# Patient Record
Sex: MALE | Race: WHITE
[De-identification: names, ages, dates, MRNs, and addresses within clinical notes are randomized per-mention and may not be internally consistent; named-entity substitution may affect disease eponyms.]

---

## 2018-09-15 ENCOUNTER — HOSPITAL ENCOUNTER (EMERGENCY)
Dept: HOSPITAL 89 - ER | Age: 38
Discharge: HOME | End: 2018-09-15
Payer: SELF-PAY

## 2018-09-15 VITALS — SYSTOLIC BLOOD PRESSURE: 122 MMHG | DIASTOLIC BLOOD PRESSURE: 77 MMHG

## 2018-09-15 DIAGNOSIS — K51.011: Primary | ICD-10-CM

## 2018-09-15 LAB — PLATELET COUNT, AUTOMATED: 561 K/UL (ref 150–450)

## 2018-09-15 PROCEDURE — 82947 ASSAY GLUCOSE BLOOD QUANT: CPT

## 2018-09-15 PROCEDURE — 82435 ASSAY OF BLOOD CHLORIDE: CPT

## 2018-09-15 PROCEDURE — 96365 THER/PROPH/DIAG IV INF INIT: CPT

## 2018-09-15 PROCEDURE — 85651 RBC SED RATE NONAUTOMATED: CPT

## 2018-09-15 PROCEDURE — 82274 ASSAY TEST FOR BLOOD FECAL: CPT

## 2018-09-15 PROCEDURE — 87449 NOS EACH ORGANISM AG IA: CPT

## 2018-09-15 PROCEDURE — 96376 TX/PRO/DX INJ SAME DRUG ADON: CPT

## 2018-09-15 PROCEDURE — 87205 SMEAR GRAM STAIN: CPT

## 2018-09-15 PROCEDURE — 99284 EMERGENCY DEPT VISIT MOD MDM: CPT

## 2018-09-15 PROCEDURE — 84460 ALANINE AMINO (ALT) (SGPT): CPT

## 2018-09-15 PROCEDURE — 87045 FECES CULTURE AEROBIC BACT: CPT

## 2018-09-15 PROCEDURE — 84075 ASSAY ALKALINE PHOSPHATASE: CPT

## 2018-09-15 PROCEDURE — 84520 ASSAY OF UREA NITROGEN: CPT

## 2018-09-15 PROCEDURE — 84295 ASSAY OF SERUM SODIUM: CPT

## 2018-09-15 PROCEDURE — 84132 ASSAY OF SERUM POTASSIUM: CPT

## 2018-09-15 PROCEDURE — 74177 CT ABD & PELVIS W/CONTRAST: CPT

## 2018-09-15 PROCEDURE — 85025 COMPLETE CBC W/AUTO DIFF WBC: CPT

## 2018-09-15 PROCEDURE — 96366 THER/PROPH/DIAG IV INF ADDON: CPT

## 2018-09-15 PROCEDURE — 84155 ASSAY OF PROTEIN SERUM: CPT

## 2018-09-15 PROCEDURE — 82040 ASSAY OF SERUM ALBUMIN: CPT

## 2018-09-15 PROCEDURE — 84450 TRANSFERASE (AST) (SGOT): CPT

## 2018-09-15 PROCEDURE — 83690 ASSAY OF LIPASE: CPT

## 2018-09-15 PROCEDURE — 82565 ASSAY OF CREATININE: CPT

## 2018-09-15 PROCEDURE — 81001 URINALYSIS AUTO W/SCOPE: CPT

## 2018-09-15 PROCEDURE — 96368 THER/DIAG CONCURRENT INF: CPT

## 2018-09-15 PROCEDURE — 82374 ASSAY BLOOD CARBON DIOXIDE: CPT

## 2018-09-15 PROCEDURE — 87324 CLOSTRIDIUM AG IA: CPT

## 2018-09-15 PROCEDURE — 83630 LACTOFERRIN FECAL (QUAL): CPT

## 2018-09-15 PROCEDURE — 82247 BILIRUBIN TOTAL: CPT

## 2018-09-15 PROCEDURE — 82310 ASSAY OF CALCIUM: CPT

## 2018-09-15 PROCEDURE — 96375 TX/PRO/DX INJ NEW DRUG ADDON: CPT

## 2018-09-15 NOTE — ER REPORT
History and Physical


Time Seen By MD:  08:20


Hx. of Stated Complaint:  


PT REPORTS ULCERATIVE COLITIS FLARE UP. REPORTS ABD PAIN AND DARK BLOODY STOOLS.


HPI/ROS


CHIEF COMPLAINT: Abdominal pain





HISTORY OF PRESENT ILLNESS: Patient is a 38-year-old male who presents to the 


emergency Department with concerns of ulcerative colitis flare. Patient carries 


this diagnosis and is on chronic daily steroids, 40 mg prednisone daily he also 


takes mesalamine and receives IV Remicade infusions. Patient was camping and 


developed crampy abdominal pain and bloody diarrhea overnight. We were the 


nearest emergency department to him so he presents here this morning for 


evaluation. Patient normally follows at [facility] but 


that was further away. Patient denies any fevers but reports severe crampy 


abdominal pain, sweats chills. Denies any chest pain or shortness of breath. 


Patient has not had any prior surgeries for ulcerative colitis to this date.





REVIEW OF SYSTEMS:


Constitutional: No fever, chills


Eyes: No discharge.


ENT: No sore throat. 


Cardiovascular: No chest pain, no palpitations.


Respiratory: No cough, no shortness of breath.


Gastrointestinal: Crampy abdominal pain, nausea bloody diarrhea


Genitourinary: No hematuria.


Musculoskeletal: No back pain.


Skin: No rashes.


Neurological: No headache.


Allergies:  


Coded Allergies:  


     Penicillins (Verified  Allergy, Intermediate, RASH, 9/15/18)


     morphine (Verified  Adverse Reaction, Severe, 9/15/18)


   


   VIOLENT BEHAVIOR


Home Meds


Active Scripts


Oxycodone Hcl (OXYCODONE HCL) 5 Mg Tablet, 5 MG PO Q4H for PAIN, #20 TAB 0 


Refills


   Prov:SOFIE ENRIQUEZ MD         9/15/18


Metronidazole (FLAGYL) 500 Mg Tablet, 500 MG PO BID, #20 TAB 0 Refills


   Prov:SOFIE ENRIQUEZ MD         9/15/18


Ciprofloxacin Hcl (CIPRO) 500 Mg Tablet, 500 MG PO BID, #20 TAB 0 Refills


   Prov:SOFIE ENRIQUEZ MD         9/15/18


Reported Medications


Ferrous Sulfate (IRON) 325 Mg Tablet, 325 MG PO DAILY


   9/15/18


Folic Acid (FOLIC ACID) 1 Mg Tablet, PO QDAY, TAB


   9/15/18


Mercaptopurine (MERCAPTOPURINE) 50 Mg Tablet, 100 MG PO DAILY


   9/15/18


Infliximab (REMICADE) 100 Mg Soln, IV [id number]


   9/15/18


Prednisone (PREDNISONE) 20 Mg Tablet, 40 MG PO QDAY, TAB


   9/15/18


Discontinued Scripts


Oxycodone Hcl/Acetaminophen (PERCOCET 5-325 MG TABLET) 1 Each Tablet, 1 EACH PO 


Q4H for PAIN, #20 TAB 0 Refills


   Prov:SOFIE ENRIQUEZ MD         9/15/18


Past Medical/Surgical History


History of ulcerative colitis


Constitutional





Vital Sign - Last 24 Hours








 9/15/18 9/15/18 9/15/18 9/15/18





 08:10 08:15 08:30 08:45


 


Temp 98.2   


 


Pulse 110 114 115 115


 


Resp 16   


 


B/P (MAP) 139/94  129/88 (102) 


 


Pulse Ox 98 96 99 96


 


O2 Delivery Room Air   


 


    





 9/15/18 9/15/18 9/15/18 9/15/18





 09:00 09:30 09:45 10:00


 


Pulse 112 110 107 101


 


B/P (MAP) 130/87 (101) 126/86 (99)  124/80 (95)


 


Pulse Ox 97 100 99 98





 9/15/18 9/15/18 9/15/18 9/15/18





 10:15 10:30 10:45 11:03


 


Pulse 101 102 112 


 


B/P (MAP)  120/81 (94)  119/75 (90)


 


Pulse Ox 98 98 98 





 9/15/18 9/15/18 9/15/18 9/15/18





 11:15 11:30 11:45 11:46


 


Pulse 115 108 106 


 


B/P (MAP)  112/70 (84)  122/77 (92)


 


Pulse Ox 98 96 96 





 9/15/18   





 12:10   


 


Pulse 116   


 


Pulse Ox 90   


 


O2 Delivery Room Air   














Intake and Output   


 


 9/15/18 9/15/18 9/16/18





 15:00 23:00 07:00


 


Intake Total 1250 ml  


 


Balance 1250 ml  








Physical Exam


General/Constitutional: Patient is awake, alert, nontoxic and in no acute 


respiratory distress. 


Head: Normocephalic and atraumatic.


Eyes: Conjunctival clear, Pupils are equal and reactive to light. Extraocular 


muscles are intact and symmetrical. Sclera are clear and anicteric.


Ears:External canals are clear. Tympanic membranes are clear with normal 


landmarks and light reflex.


Nares: No rhinorrhea or bleeding. Turbinates are pink and moist.


Oropharyngeal: Mucous membranes are moist. There is no pharyngeal erythema or 


exudate. There are no palatal petechiae. Uvula is midline and symmetrical. 


Neck: Supple, no adenopathy.


Cardiovascular: Heart is regular rate and rhythm without audible murmurs, rubs 


or gallops. 


Pulmonary: Lungs are clear to auscultation bilaterally. There are no wheezes, 


rales, or rhonchi. Chest rise is symmetrical


Abdomen: Diffuse abdominal pain, increased bowel sounds no peritoneal signs


Extremities: No gross deformities, No peripheral cyanosis. Able to move all 4 


extremities.


Neuro: Alert and oriented X3, 


Skin: No rashes, skin is warm dry and well perfused.





Medical Decision Making


Data Points


Result Diagram:  


9/15/18 0820                                                                    


           9/15/18 0820





Laboratory





Hematology








Test


 9/15/18


08:20 9/15/18


08:44 9/15/18


11:04


 


Red Blood Count


 3.60 M/uL


(4.00-5.60) 


 





 


Mean Corpuscular Volume


 87.7 fL


(80.0-96.0) 


 





 


Mean Corpuscular Hemoglobin


 28.0 pg


(26.0-33.0) 


 





 


Mean Corpuscular Hemoglobin


Concent 31.9 g/dL


(32.0-36.0) 


 





 


Red Cell Distribution Width


 17.4 %


(11.5-14.5) 


 





 


Mean Platelet Volume


 6.6 fL


(7.2-11.1) 


 





 


Neutrophils (%) (Auto)


 65.0 %


(39.4-72.5) 


 





 


Lymphocytes (%) (Auto)


 23.1 %


(17.6-49.6) 


 





 


Monocytes (%) (Auto)


 10.0 %


(4.1-12.4) 


 





 


Eosinophils (%) (Auto)


 1.4 %


(0.4-6.7) 


 





 


Basophils (%) (Auto)


 0.5 %


(0.3-1.4) 


 





 


Nucleated RBC Relative Count


(auto) 0.0 /100WBC 


 


 





 


Neutrophils # (Auto)


 4.6 K/uL


(2.0-7.4) 


 





 


Lymphocytes # (Auto)


 1.6 K/uL


(1.3-3.6) 


 





 


Monocytes # (Auto)


 0.7 K/uL


(0.3-1.0) 


 





 


Eosinophils # (Auto)


 0.1 K/uL


(0.0-0.5) 


 





 


Basophils # (Auto)


 0.0 K/uL


(0.0-0.1) 


 





 


Nucleated RBC Absolute Count


(auto) 0.00 K/uL 


 


 





 


Erythrocyte Sedimentation Rate


 69 mm/HOUR


(0-15) 


 





 


Sodium Level


 139 mmol/L


(137-145) 


 





 


Potassium Level


 3.8 mmol/L


(3.5-5.0) 


 





 


Chloride Level


 99 mmol/L


() 


 





 


Carbon Dioxide Level


 28 mmol/L


(22-30) 


 





 


Blood Urea Nitrogen 7 mg/dl (9-21)   


 


Creatinine


 0.80 mg/dl


(0.66-1.25) 


 





 


Glomerular Filtration Rate


Calc > 60.0 


 


 





 


Random Glucose


 120 mg/dl


() 


 





 


Calcium Level


 8.9 mg/dl


(8.4-10.2) 


 





 


Total Bilirubin


 0.3 mg/dl


(0.2-1.3) 


 





 


Aspartate Amino Transf


(AST/SGOT) 28 U/L (0-35) 


 


 





 


Alanine Aminotransferase


(ALT/SGPT) 34 U/L (0-56) 


 


 





 


Alkaline Phosphatase


 101 U/L


(0-126) 


 





 


Total Protein


 6.8 g/dl


(6.3-8.2) 


 





 


Albumin


 3.5 g/dl


(3.5-5.0) 


 





 


Lipase


 33 U/L


() 


 





 


Stool Occult Blood (IFOB)


 


 Positive


(NEGATIVE) 





 


Stool Leukocytes, Qualitative  Positive  


 


Clostridium Difficile Toxin A


& B 


 Negative 


 





 


Clostridium difficile Antigen  Positive  


 


Urine Color   Yellow 


 


Urine Clarity   Clear 


 


Urine pH


 


 


 6.0 pH


(4.8-9.5)


 


Urine Specific Gravity   S3 


 


Urine Protein


 


 


 Negative mg/dL


(NEGATIVE)


 


Urine Glucose (UA)


 


 


 Negative mg/dL


(NEGATIVE)


 


Urine Ketones


 


 


 Negative mg/dL


(NEGATIVE)


 


Urine Blood


 


 


 Negative


(NEGATIVE)


 


Urine Nitrite


 


 


 Negative


(NEGATIVE)


 


Urine Bilirubin


 


 


 Negative


(NEGATIVE)


 


Urine Urobilinogen


 


 


 Negative mg/dL


(0.2-1.9)


 


Urine Leukocyte Esterase


 


 


 Negative


(NEGATIVE)


 


Urine RBC


 


 


 None /HPF


(0-2/HPF)


 


Urine WBC


 


 


 None /HPF


(0-5/HPF)


 


Urine Squamous Epithelial


Cells 


 


 None /LPF


(</=FEW)


 


Urine Bacteria


 


 


 Negative /HPF


(NONE-FEW)


 


Urine Mucus


 


 


 Few /HPF


(NONE-FEW)








Chemistry








Test


 9/15/18


08:20 9/15/18


08:44 9/15/18


11:04


 


White Blood Count


 7.1 k/uL


(4.5-11.0) 


 





 


Red Blood Count


 3.60 M/uL


(4.00-5.60) 


 





 


Hemoglobin


 10.1 g/dL


(14.0-18.0) 


 





 


Hematocrit


 31.6 %


(42.0-52.0) 


 





 


Mean Corpuscular Volume


 87.7 fL


(80.0-96.0) 


 





 


Mean Corpuscular Hemoglobin


 28.0 pg


(26.0-33.0) 


 





 


Mean Corpuscular Hemoglobin


Concent 31.9 g/dL


(32.0-36.0) 


 





 


Red Cell Distribution Width


 17.4 %


(11.5-14.5) 


 





 


Platelet Count


 561 K/uL


(150-450) 


 





 


Mean Platelet Volume


 6.6 fL


(7.2-11.1) 


 





 


Neutrophils (%) (Auto)


 65.0 %


(39.4-72.5) 


 





 


Lymphocytes (%) (Auto)


 23.1 %


(17.6-49.6) 


 





 


Monocytes (%) (Auto)


 10.0 %


(4.1-12.4) 


 





 


Eosinophils (%) (Auto)


 1.4 %


(0.4-6.7) 


 





 


Basophils (%) (Auto)


 0.5 %


(0.3-1.4) 


 





 


Nucleated RBC Relative Count


(auto) 0.0 /100WBC 


 


 





 


Neutrophils # (Auto)


 4.6 K/uL


(2.0-7.4) 


 





 


Lymphocytes # (Auto)


 1.6 K/uL


(1.3-3.6) 


 





 


Monocytes # (Auto)


 0.7 K/uL


(0.3-1.0) 


 





 


Eosinophils # (Auto)


 0.1 K/uL


(0.0-0.5) 


 





 


Basophils # (Auto)


 0.0 K/uL


(0.0-0.1) 


 





 


Nucleated RBC Absolute Count


(auto) 0.00 K/uL 


 


 





 


Erythrocyte Sedimentation Rate


 69 mm/HOUR


(0-15) 


 





 


Glomerular Filtration Rate


Calc > 60.0 


 


 





 


Calcium Level


 8.9 mg/dl


(8.4-10.2) 


 





 


Total Bilirubin


 0.3 mg/dl


(0.2-1.3) 


 





 


Aspartate Amino Transf


(AST/SGOT) 28 U/L (0-35) 


 


 





 


Alanine Aminotransferase


(ALT/SGPT) 34 U/L (0-56) 


 


 





 


Alkaline Phosphatase


 101 U/L


(0-126) 


 





 


Total Protein


 6.8 g/dl


(6.3-8.2) 


 





 


Albumin


 3.5 g/dl


(3.5-5.0) 


 





 


Lipase


 33 U/L


() 


 





 


Stool Occult Blood (IFOB)


 


 Positive


(NEGATIVE) 





 


Stool Leukocytes, Qualitative  Positive  


 


Clostridium Difficile Toxin A


& B 


 Negative 


 





 


Clostridium difficile Antigen  Positive  


 


Urine Color   Yellow 


 


Urine Clarity   Clear 


 


Urine pH


 


 


 6.0 pH


(4.8-9.5)


 


Urine Specific Gravity   S3 


 


Urine Protein


 


 


 Negative mg/dL


(NEGATIVE)


 


Urine Glucose (UA)


 


 


 Negative mg/dL


(NEGATIVE)


 


Urine Ketones


 


 


 Negative mg/dL


(NEGATIVE)


 


Urine Blood


 


 


 Negative


(NEGATIVE)


 


Urine Nitrite


 


 


 Negative


(NEGATIVE)


 


Urine Bilirubin


 


 


 Negative


(NEGATIVE)


 


Urine Urobilinogen


 


 


 Negative mg/dL


(0.2-1.9)


 


Urine Leukocyte Esterase


 


 


 Negative


(NEGATIVE)


 


Urine RBC


 


 


 None /HPF


(0-2/HPF)


 


Urine WBC


 


 


 None /HPF


(0-5/HPF)


 


Urine Squamous Epithelial


Cells 


 


 None /LPF


(</=FEW)


 


Urine Bacteria


 


 


 Negative /HPF


(NONE-FEW)


 


Urine Mucus


 


 


 Few /HPF


(NONE-FEW)








Urinalysis








Test


 9/15/18


11:04


 


Urine Color Yellow 


 


Urine Clarity Clear 


 


Urine pH


 6.0 pH


(4.8-9.5)


 


Urine Specific Gravity S3 


 


Urine Protein


 Negative mg/dL


(NEGATIVE)


 


Urine Glucose (UA)


 Negative mg/dL


(NEGATIVE)


 


Urine Ketones


 Negative mg/dL


(NEGATIVE)


 


Urine Blood


 Negative


(NEGATIVE)


 


Urine Nitrite


 Negative


(NEGATIVE)


 


Urine Bilirubin


 Negative


(NEGATIVE)


 


Urine Urobilinogen


 Negative mg/dL


(0.2-1.9)


 


Urine Leukocyte Esterase


 Negative


(NEGATIVE)


 


Urine RBC


 None /HPF


(0-2/HPF)


 


Urine WBC


 None /HPF


(0-5/HPF)


 


Urine Squamous Epithelial


Cells None /LPF


(</=FEW)


 


Urine Bacteria


 Negative /HPF


(NONE-FEW)


 


Urine Mucus


 Few /HPF


(NONE-FEW)








Microbiology





Microbiology








 Date/Time


Source Procedure


Growth Status





 


 9/15/18 08:44


Stool Gram Stain - Final Resulted


 


 9/15/18 08:44


Stool Stool Culture


Pending Resulted











EKG/Imaging


EKG Interpretation


 09/15/2018 12:21:08 pm EKG shows sinus bradycardia with a ventricular rate of 


56 bpm is otherwise normal


Monitor Interpretation:  Normal Sinus Rhythm


Imaging


FAC[place] [facility] [place] 


 


PATIENT NAME: Anson Alfred


: 1980


MR: [id number]


V: [id number]


EXAM DATE: 


ORDERING PHYSICIAN: SOFIE ENRIQUEZ


TECHNOLOGIST: 


 


Location: [facility] [place]


Patient: Anson Alfred


: 1980


MRN: [id number]


Visit/Account:[id number]


Date of Sevice:  9/15/2018


 


ACCESSION #: [id number]


 


ABDOMEN/PELVIS WITH CONTRAST


 


HISTORY:ulcerative colitis


 


TECHNIQUE:  CT abdomen and pelvis with intravenous contrast.  Contiguous axial 


images of the abdomen and pelvis was performed from the lung bases to the 


symphysis pubis.


 


One of the following dose optimization techniques was utilized in the 


performance of this exam: Automated exposure control; adjustment of the mA 


and/or kV according to the patient's size; or use of an iterative  


reconstruction technique.  Specific details can be referenced in the facility's 


radiology CT exam operational policy.


 


CONTRAST:  85 cc of Isovue-370


 


COMPARISON:  None.


 


FINDINGS:


 


Visualized lung bases:  Negative.


 


Hepatobiliary:  There is fatty infiltration of liver. Liver measures 19.6 cm in 


craniocaudal length which is slightly enlarged. Gallbladder and bile ducts are 


unremarkable.


 


Spleen:  Negative.


 


Adrenals:  Negative.


 


Kidneys/:  Negative.


 


Pancreas:  Negative.


 


GI:  There is circumferential thickening and mild inflammation of the entire 


colon in keeping with diagnosis of ulcerative colitis. There is sparing of the 


small bowel and terminal ileum. Appendix is mildly dilated measures 7 mm without


inflammatory changes likely related to patient's underlying ulcerative colitis. 


No bowel perforation, abscess or obstruction.


 


Vessels/spaces/nodes:  Slightly prominent retroperitoneal and mesenteric lymph 


nodes are likely reactive.


 


Bones/soft tissues:  Degenerative changes are noted in the spine. Circumscribed 


lucent and centrally sclerotic lesion left iliac bone is likely benign in this 


age group. Benign sclerotic bone island left sepsis pubis is noted.


 


IMPRESSION:


 


1.  Significant thickening and mild inflammation of the entire colon in keeping 


with diagnosis of ulcerative colitis. No evidence for bowel perforation, abscess


formation or obstruction. The small bowel and terminal ileum are spared.


 


Report Dictated By: Thomas Dunphy, MD at 9/15/2018 9:31 AM


 


Report E-Signed By: Thomas Dunphy, MD  at 9/15/2018 9:38 AM


 


WSN:[id number]





ED Course/Re-evaluation


Clinical Indication for ER IV:  Hydration, IV Access


ED Course


Plan at this time will be to give bolus of IV steroids along with oral 


mesalanine; we will perform CBC give IV fluid bolus and check a CT scan of the 


abdomen and pelvis.





 09/15/2018 10:13:03 am CT scan reveals diffuse inflammatory bowel changes 


without evidence of perforation or abscess. C. difficile toxins and B- antigen 


positive which is an indeterminate result. Plan at this time will be to place 


the patient on both Cipro and Flagyl as an outpatient. We will have him continue


his current ulcerative colitis regime. We will have him follow-up with his GI 


specialist in [place] on Monday.


Decision to Disposition Date:  Sep 15, 2018


Decision to Disposition Time:  11:49





Depart


Departure


Latest Vital Signs





Vital Signs








  Date Time  Temp Pulse Resp B/P (MAP) Pulse Ox O2 Delivery O2 Flow Rate FiO2


 


9/15/18 12:10  116   90 Room Air  


 


9/15/18 11:46    122/77 (92)    


 


9/15/18 08:10 98.2  16     








Impression:  


   Primary Impression:  


   Ulcerative colitis


Condition:  Improved


Disposition:  HOME OR SELF-CARE


New Scripts


Oxycodone Hcl (OXYCODONE HCL) 5 Mg Tablet


5 MG PO Q4H for PAIN, #20 TAB 0 Refills


   Prov: SOFIE ENRIQUEZ MD         9/15/18 


Metronidazole (FLAGYL) 500 Mg Tablet


500 MG PO BID, #20 TAB 0 Refills


   Prov: SOFIE ENRIQUEZ MD         9/15/18 


Ciprofloxacin Hcl (CIPRO) 500 Mg Tablet


500 MG PO BID, #20 TAB 0 Refills


   Prov: SOFIE ENRIQUEZ MD         9/15/18


Patient Instructions:  Ulcerative Colitis (DC)





Additional Instructions:  


Continue all your medications as prescribed





Start your antibiotics and take as directed until complete





Call your GI doctor Monday to arrange follow-up, if your symptoms worsen at any 


time you should go to the nearest emergency department for reevaluation





Problem Qualifiers








   Primary Impression:  


   Ulcerative colitis


   Ulcerative colitis location:  ulcerative pancolitis  Digestive disease 


   complication type:  with rectal bleeding  Qualified Codes:  K51.011 - 


   Ulcerative (chronic) pancolitis with rectal bleeding








SOFIE ENRIQUEZ MD             Sep 15, 2018 08:42

## 2018-09-15 NOTE — RADIOLOGY IMAGING REPORT
FACILITY: [facility] 

 

PATIENT NAME: Anson Alfred

: 1980

MR: [id number]

V: [id number]

EXAM DATE: 

ORDERING PHYSICIAN: SOFIE ENRIQUEZ

TECHNOLOGIST: 

 

[place] [facility] [place]

Patient: Anson Alfred

: 1980

MRN: [id number]

Visit/Account:[id number]

Date of Sevice:  9/15/2018

 

ACCESSION #: [id number]

 

ABDOMEN/PELVIS WITH CONTRAST

 

HISTORY:ulcerative colitis

 

TECHNIQUE:  CT abdomen and pelvis with intravenous contrast.  Contiguous axial images of the abdomen 
and pelvis was performed from the lung bases to the symphysis pubis.

 

One of the following dose optimization techniques was utilized in the performance of this exam: Autom
ated exposure control; adjustment of the mA and/or kV according to the patient's size; or use of an i
terative  reconstruction technique.  Specific details can be referenced in the facility's radiology C
T exam operational policy.

 

CONTRAST:  85 cc of Isovue-370

 

COMPARISON:  None.

 

FINDINGS:

 

Visualized lung bases:  Negative.

 

Hepatobiliary:  There is fatty infiltration of liver. Liver measures 19.6 cm in craniocaudal length w
hich is slightly enlarged. Gallbladder and bile ducts are unremarkable.

 

Spleen:  Negative.

 

Adrenals:  Negative.

 

Kidneys/:  Negative.

 

Pancreas:  Negative.

 

GI:  There is circumferential thickening and mild inflammation of the entire colon in keeping with di
agnosis of ulcerative colitis. There is sparing of the small bowel and terminal ileum. Appendix is mi
ldly dilated measures 7 mm without inflammatory changes likely related to patient's underlying ulcera
tive colitis. No bowel perforation, abscess or obstruction.

 

Vessels/spaces/nodes:  Slightly prominent retroperitoneal and mesenteric lymph nodes are likely react
[place].

 

Bones/soft tissues:  Degenerative changes are noted in the spine. Circumscribed lucent and centrally 
sclerotic lesion left iliac bone is likely benign in this age group. Benign sclerotic bone island lef
t sepsis pubis is noted.

 

IMPRESSION:

 

1.  Significant thickening and mild inflammation of the entire colon in keeping with diagnosis of ulc
erative colitis. No evidence for bowel perforation, abscess formation or obstruction. The small bowel
 and terminal ileum are spared.

 

Report Dictated By: Thomas Dunphy, MD at 9/15/2018 9:31 AM

 

Report E-Signed By: Thomas Dunphy, MD  at 9/15/2018 9:38 AM

 

WSN:[id number]